# Patient Record
Sex: MALE | Race: WHITE | NOT HISPANIC OR LATINO | Employment: UNEMPLOYED | URBAN - METROPOLITAN AREA
[De-identification: names, ages, dates, MRNs, and addresses within clinical notes are randomized per-mention and may not be internally consistent; named-entity substitution may affect disease eponyms.]

---
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
## 2023-01-04 ENCOUNTER — CLINICAL SUPPORT (OUTPATIENT)
Dept: FAMILY MEDICINE CLINIC | Facility: CLINIC | Age: 18
End: 2023-01-04

## 2023-01-04 DIAGNOSIS — Z23 IMMUNIZATION DUE: Primary | ICD-10-CM

## 2024-12-30 ENCOUNTER — OFFICE VISIT (OUTPATIENT)
Dept: FAMILY MEDICINE CLINIC | Facility: CLINIC | Age: 19
End: 2024-12-30
Payer: COMMERCIAL

## 2024-12-30 VITALS
DIASTOLIC BLOOD PRESSURE: 82 MMHG | TEMPERATURE: 98.7 F | RESPIRATION RATE: 16 BRPM | HEIGHT: 71 IN | BODY MASS INDEX: 21.73 KG/M2 | HEART RATE: 66 BPM | OXYGEN SATURATION: 99 % | SYSTOLIC BLOOD PRESSURE: 122 MMHG | WEIGHT: 155.2 LBS

## 2024-12-30 DIAGNOSIS — K50.90 CROHN'S DISEASE WITHOUT COMPLICATION, UNSPECIFIED GASTROINTESTINAL TRACT LOCATION (HCC): ICD-10-CM

## 2024-12-30 DIAGNOSIS — Z00.01 ENCOUNTER FOR WELL ADULT EXAM WITH ABNORMAL FINDINGS: Primary | ICD-10-CM

## 2024-12-30 PROCEDURE — 99395 PREV VISIT EST AGE 18-39: CPT | Performed by: FAMILY MEDICINE

## 2024-12-30 NOTE — PROGRESS NOTES
"Name: Logan Seaver      : 2005      MRN: [id number]  Encounter Provider: Amanuel Mckeon DO  Encounter Date: 2024   Encounter department: [facility]  :  Assessment & Plan  Encounter for well adult exam with abnormal findings  19-year-old male  History of Crohn's disease Followed by GI and gets infusions  Otherwise he is doing well with no other complaints he can follow-up in 1 year or sooner if needed he is up-to-date and health maintenance  Discussed possibility of an updated pneumonia shot sometime in the future       Crohn's disease without complication, unspecified gastrointestinal tract location (HCC)               Depression Screening and Follow-up Plan: Patient was screened for depression during today's encounter. They screened negative with a PHQ-2 score of 0.      History of Present Illness     Patient presents today for yearly physical  Overall he is doing well  He has a GI appointment tomorrow for follow-up for his Crohn's  His Crohn's been under control with his infusions  No other complaints today      Review of Systems   Constitutional: Negative.    HENT: Negative.     Eyes: Negative.    Respiratory: Negative.     Cardiovascular: Negative.    Gastrointestinal: Negative.    Endocrine: Negative.    Genitourinary: Negative.    Musculoskeletal: Negative.    Skin: Negative.    Allergic/Immunologic: Negative.    Neurological: Negative.    Hematological: Negative.    Psychiatric/Behavioral: Negative.     All other systems reviewed and are negative.      Objective   /82 (BP Location: Left arm, Patient Position: Sitting, Cuff Size: Large)   Pulse 66   Temp 98.7 °F (37.1 °C) (Tympanic)   Resp 16   Ht 5' 11.4\" (1.814 m)   Wt 70.4 kg (155 lb 3.2 oz)   SpO2 99%   BMI 21.40 kg/m²      Physical Exam  Vitals and nursing note reviewed.   Constitutional:       Appearance: Normal appearance. He is well-developed.   HENT:      Head: Normocephalic.      Right Ear: External " ear normal.      Left Ear: External ear normal.      Nose: Nose normal.   Eyes:      Conjunctiva/sclera: Conjunctivae normal.      Pupils: Pupils are equal, round, and reactive to light.   Cardiovascular:      Rate and Rhythm: Normal rate and regular rhythm.      Heart sounds: Normal heart sounds.   Pulmonary:      Effort: Pulmonary effort is normal.      Breath sounds: Normal breath sounds.   Abdominal:      General: Bowel sounds are normal.      Palpations: Abdomen is soft.   Musculoskeletal:         General: Normal range of motion.      Cervical back: Normal range of motion and neck supple.   Skin:     General: Skin is warm and dry.   Neurological:      General: No focal deficit present.      Mental Status: He is alert and oriented to person, place, and time.   Psychiatric:         Behavior: Behavior normal.         Thought Content: Thought content normal.         Judgment: Judgment normal.

## 2024-12-31 ENCOUNTER — OFFICE VISIT (OUTPATIENT)
Age: 19
End: 2024-12-31
Payer: COMMERCIAL

## 2024-12-31 ENCOUNTER — TELEPHONE (OUTPATIENT)
Age: 19
End: 2024-12-31

## 2024-12-31 VITALS
WEIGHT: 153 LBS | SYSTOLIC BLOOD PRESSURE: 118 MMHG | BODY MASS INDEX: 21.42 KG/M2 | HEART RATE: 73 BPM | HEIGHT: 71 IN | OXYGEN SATURATION: 99 % | DIASTOLIC BLOOD PRESSURE: 80 MMHG | TEMPERATURE: 98.4 F

## 2024-12-31 DIAGNOSIS — K50.113 CROHN'S DISEASE OF LARGE INTESTINE WITH FISTULA (HCC): Primary | ICD-10-CM

## 2024-12-31 DIAGNOSIS — D84.9 IMMUNOSUPPRESSION (HCC): ICD-10-CM

## 2024-12-31 PROCEDURE — 99214 OFFICE O/P EST MOD 30 MIN: CPT | Performed by: INTERNAL MEDICINE

## 2024-12-31 RX ORDER — SODIUM CHLORIDE, SODIUM LACTATE, POTASSIUM CHLORIDE, CALCIUM CHLORIDE 600; 310; 30; 20 MG/100ML; MG/100ML; MG/100ML; MG/100ML
125 INJECTION, SOLUTION INTRAVENOUS CONTINUOUS
Status: CANCELLED | OUTPATIENT
Start: 2024-12-31
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
## 2024-12-31 NOTE — H&P (VIEW-ONLY)
Gastroenterology Outpatient Follow-up - Crohn's Disease  Logan Seaver 19 y.o. male MRN: [id number]  Encounter: [id number]    Logan Seaver is a 19 y.o. male with Crohn's disease.    Symptom onset:  2016  Diagnosis:  Crohns disease  Year of diagnosis:  2021    IBD Summary: Carlos has history of fistulizing perianal Crohn's disease with mild involvement of the rectum and TI, diagnosed in 2021.  He has been in clinical remission on infliximab 10 mg/kg every 8 weeks since diagnosis.  Establishing care with me in 12/2024.    CD Summary  Current Crohn's disease phenotype:  penetrating    Involved sites since disease onset   Esophagus: no   Stomach: no     Duodenum: no   Jejunum: no   Ileum: yes   Right colon: no   Transverse colon: no   Left colon: yes   Rectum: no   Anus: no     History of stricture  Esophagus: no   Stomach: no   Duodenum: no   Jejunum: no   Ileum: no   Right colon: no   Transverse colon: no   Left colon: no   Rectum: no   Anus: no     History of fistula other than perianal:  Intra-abdominal   abcess: no      Enteroenteric fistula: no      Enterocutaneous fistula: no      Enterovesical fistula: no      Other fistula: no            Surgical History  Number of IBD surgeries: 0      First IBD surgery:    Most Recent IBD surgery:    Esophageal:  0    Gastroduodenal:  0    Small bowel resection(s):  0    Ileocolonic resection(s): 0      Colonic resection(s):  0    Ileostomy or colostomy:  no previous ostomy    Complete colectomy:  No         Medications     Year last used Reason for discontinuation   Corticosteroids   never         Thiopurines   never         Methotrexate   never         Infliximab prior     CR     Adalimumab   never         Certolizumab   never         Golimumab   never         Natalizumab   never         Mesalamine prior   2021 PNR     Sulfasalzine   never         Vedolizumab   never         Ustekinumab   never         Tofacitinib   never         Other biologic   never              Extraintestinal Manifestations    IBD-associated arthropathy No    Uveitis No    Oral aphthous ulcers No   Erythema nodosum No    Pyoderma gangrenosum No    Primary sclerosing cholangitis No    Thrombotic complications No          Cancer / Dysplasia History  History of IBD-associated dysplasia: none    Date of diagnosis (Year):     History of colorectal cancer: No   History of cervical dysplasia: No   History of skin cancer: none         Laboratory Data   Most recent (date) Result   PPD   unknown   Quanitferon gold 4/11/2023 negative   TPMT   unknown   Hepatitis A   unknown   HBsAb 4/11/2023 negative   HBcAb 4/11/2023 negative   HBsAg 4/11/2023 negative   HCV Ab   unknown       Imaging / Diagnostic Procedures   Most recent (date) Findings   Colonoscopy or sigmoidoscopy #1              Ulcers/Erosions              Strictures              Stricture Severity              Endoscopic Score Stephen Score:    Rutgeert's:               Findings              Pathology      Colonoscopy or sigmoidoscopy #2 6/29/2021            Ulcers/Erosions  small              Strictures  none              Stricture Severity  N/A           Endoscopic Score Stephen Score:  0 Rugeert's:  N/A           Findings  Large perianal skin tags, scattered aphthous ulcers in sigmoid colon and exudate in the TI.   > Path: normal esophagus, antrum, and duodenum. TI with small bowel mucosa; R colon with colonic mucosa; L colon with single focus of minimal acute cryptitis and no architectural distortion seen           Pathology      EGD 6/29/2021     Small bowel follow-through       CT enterography       CT without enterography       MRI enterography       Capsule study       DEXA scan   Lowest Z-score:      CXR (for TB)           HPI:   Interval History:  12/31/2024 Initial visit  Carlos is establishing care with me for history of fistulizing ileal, colonic, and perianal Crohn's disease.    He initially developed symptoms in 2016 consisting of loose stools  and abdominal pain, and was evaluated at [facility].  Of note, his brother has infantile-onset IBD.  Initial EGD and colonoscopy were normal and his symptoms improved.  However, in 2021, he started having rectal bleeding, anal pain with defecation, and sensation of swelling in the rectal region.  Repeat EGD and colonoscopy in 2021 showed large skin tags, scattered aphthous ulcers in the sigmoid colon, and exudate in the TI.  Biopsies were again normal, except for a small focus of mild cryptitis.  He was treated with topical mesalamine, although MRI enterography did suggest a short segment of TI inflammation.  Subsequently, he developed worsening perianal pain, and pelvic MRI in August 2021 showed a rectal abscess with mass effect on the lumen and extensive surrounding inflammation, as well as a transsphincteric fistula.  He underwent EUA, but abscess or fistula could not be found, so he was treated with antibiotics and started on infliximab 10 mg/kg every 8 weeks.  Repeat MRI in March 2022 showed near complete resolution of the abscess and fistulas.    Carlos has remained on infliximab 10 mg/kg every 8 weeks and, overall, feels well.  He is in his last semester of college in [place] and has continued to get his infusions over there.  He did see a gastroenterologist in 2023, but has not followed up regularly.  His last colonoscopy was in 2021.  He recently had mild bleeding in the setting of constipation and an inflamed skin tag, and this is improving.  No recent infliximab level.  Last QuantiFERON and hepatitis labs are from 2023.  He had influenza and strep throat in November.  He has not received the flu vaccine this season but usually gets it.  He has not had Prevnar 20.  He has not recently seen dermatology.  No history of smoking.  As mentioned, he has a younger brother diagnosed with severe IBD at 18 months of age.  He is home for a few more days and would like to get updated labs and schedule  "colonoscopy.    Carlos reports the following symptoms over the last 7 days:    Stool Frequency normal   Average stools per day: 1   Average liquid stools per day: 0   Consistency of bowel: formed   Awakening from sleep to move bowels? No   Urgency no fecal urgency   Visible blood in stool? none   Abdominal pain? none   General Wellbeing? generally well     Carlos also reports the following symptoms in the last month:    Leakage of stool while sleeping? No   Leakage of stool while awake? No       REVIEW OF SYSTEMS:  During the last 7 days, Carlos experienced the following symptoms:  Unintentional Weight Loss (in the last month) No   Fever No   Eye irritation No   Mouth sores No   Sore throat No   Chest pain No   Shortness of breath No   Numbness or tingling in hands or feet No   Skin rash No   Pain or swelling in joints No   Bruising or bleeding No   Felt depressed or blue No   Fatigue No   Dysuria No   Please see HPI for additional pertinent review of systems; otherwise remainder of ROS was unremarkable    MEDICATIONS:    Current Outpatient Medications:     cholecalciferol (VITAMIN D3) 1,000 units tablet    inFLIXimab-dyyb (INFLECTRA IV)    Multiple Vitamins-Minerals (multivitamin with minerals) tablet    ALLERGIES:  Allergies   Allergen Reactions    Gadolinium Rash     Rash on his bottom and body after MRE and MRI pelvis       OBJECTIVE:  /80 (BP Location: Left arm, Patient Position: Sitting, Cuff Size: Adult)   Pulse 73   Temp 98.4 °F (36.9 °C) (Tympanic)   Ht 5' 11.4\" (1.814 m)   Wt 69.4 kg (153 lb)   SpO2 99%   BMI 21.10 kg/m²      PHYSICAL EXAM:    General Appearance:   Alert, cooperative, no distress   HEENT:   Normocephalic, atraumatic, anicteric.     Neck:  Supple, symmetrical, trachea midline   Lungs:   Clear to auscultation bilaterally; no rales, rhonchi or wheezing; respirations unlabored    Heart::   Regular rate and rhythm; no murmur, rub, or gallop.   Abdomen:   Soft, non-distended; normal " "bowel sounds    Abdominal exam was notable for no tenderness with no mass.     Genitalia:   Deferred    Rectal:   Perirectal assessment was not performed.                     Extremities:  No cyanosis, clubbing or edema    Pulses:  2+ and symmetric    Skin:  No jaundice, rashes, or lesions    Lymph nodes:  No palpable cervical lymphadenopathy      No results found for: \"WBC\", \"HGB\", \"HCT\", \"MCV\", \"PLT\"  No results found for: \"NA\", \"SODIUM\", \"K\", \"CL\", \"CO2\", \"ANIONGAP\", \"AGAP\", \"BUN\", \"CREATININE\", \"GLUC\", \"GLUF\", \"CALCIUM\", \"AST\", \"ALT\", \"ALKPHOS\", \"PROT\", \"TP\", \"BILITOT\", \"TBILI\", \"EGFR\"  No results found for: \"CRP\"  No results found for: \"VDFKOVRE75\"  No results found for: \"FERRITIN\"    ASSESSMENT AND PLAN:    Carlos has a history of penetrating Crohn’s disease diagnosed in 2021 with involvement in the following areas:      Ileum,   Left colon,   with perianal fistula . Surgical history includes no small bowel resections, no colon resections, and no prior ostomy. Current medical therapy is with infliximab 10 mg/kg every 8 weeks. My global assessment is that the clinical disease activity is currently quiescent.    The 6-point Stephen score was 0 and the 9-point Stephen score was 0.  The short CDAI was 44.      Carlos has history of Crohn's disease with mild luminal involvement of the TI and sigmoid colon and penetrating perianal complications.  He has a family history of IBD.  He has been on infliximab since 2021 and doing well clinically.  It sounds like he may have an inflamed skin tag currently, but otherwise seems to be in remission.  He is overdue for labs and colonoscopy.  He has a good understanding of his disease.  1.  We will schedule colonoscopy while he is home for break.  2.  Check labs including CBC, CMP, CRP.  3.  Also check B12, iron, vitamin D.  4.  Check infliximab trough level and antibodies.  5.  Referral to dermatology.  Recommendations below.  6.  I recommend the Prevnar 20 vaccine and will discuss " with him.  7.  I recommend establishing regular care with GI in [place].  I am happy to see him whenever he is in the area.     Health Maintenance Recommendations:  Vaccines & Infections  COVID-19 vaccination and boosters are recommended. There is no evidence that the COVID-19 vaccine would cause an IBD flare.  Avoid live vaccines if on immunosuppressive therapy.  Yearly influenza vaccine (flu shot).  Pneumonia vaccines for patients on immunosuppression. These include Prevnar 20, followed by Pneumovax 23 at least 8 weeks later.  Shingrix vaccine (series of 2 injections) for al patients 65 and older. Patients on tofacitinib or upadacitinib should be vaccinated regardless of age.  If not immune to measles mumps or rubella, MMR vaccine is recommended. However, this is a live vaccine and should be given prior to immunosuppressive therapy.  HPV vaccination as per national guidelines.  Hepatitis A and B vaccinations if not previously vaccinated.  Testing for tuberculosis with QuantiFERON Gold blood test and/or chest xray prior to starting immunosuppressive medications, and then annually    Cancer screening  Dysplasia surveillance for colorectal cancer. Colonoscopy in all patients with extensive colitis (more than 1/3 of the colon involved) who had disease for at least 8 or more years.  Repeat colonoscopy approximately every 12-24 months.  In patients with concurrent primary sclerosing cholangitis, history of dysplasia, or family history of colon cancer, repeat colonoscopy annually.    Females: Pap smear annually for woman on immunosuppression.  Annual dermatologic/skin exam in all patients with IBD, especially those on immunosuppression with thiopurines or ANALI inhibitors.    Miscellaneous  DEXA scan, once off steroids for 3 months  Depression screening recommended annually  Routine dental and ophthalmology examinations    Problem List Items Addressed This Visit       Crohn's disease (HCC) - Primary    Relevant Orders     CBC and differential    Comprehensive metabolic panel    C-reactive protein    TIBC Panel (incl. Iron, TIBC, % Iron Saturation)    Vitamin D 25 hydroxy    Vitamin B12    Ferritin    Hepatitis B surface antigen    Hepatitis C antibody    Quantiferon TB Gold Plus Assay    Infliximab Concentration and Anti-Infliximab Antibody    Colonoscopy    Ambulatory Referral to Dermatology     Other Visit Diagnoses         Immunosuppression (HCC)        Relevant Orders    CBC and differential    Comprehensive metabolic panel    C-reactive protein    TIBC Panel (incl. Iron, TIBC, % Iron Saturation)    Vitamin D 25 hydroxy    Vitamin B12    Ferritin    Hepatitis B surface antigen    Hepatitis C antibody    Quantiferon TB Gold Plus Assay    Infliximab Concentration and Anti-Infliximab Antibody    Colonoscopy    Ambulatory Referral to Dermatology            Shameka García MD

## 2024-12-31 NOTE — TELEPHONE ENCOUNTER
Procedure: colon  Date: Jan 3rd  Physician performing: Dr. García   Location of procedure:  [facility]  Instructions given to patient: miralax  Diabetic: n/a  Clearances: n/a

## 2025-01-01 RX ORDER — SODIUM CHLORIDE 9 MG/ML
125 INJECTION, SOLUTION INTRAVENOUS CONTINUOUS
Status: CANCELLED | OUTPATIENT
Start: 2025-01-01

## 2025-01-02 NOTE — TELEPHONE ENCOUNTER
Reviewed lab schedule - okay to add EGD  Ordered EGD and added for tomorrow  Emailed [facility] about adding EGD for tomorrow    Spoke to Yumiko - informed and agreeable

## 2025-01-02 NOTE — TELEPHONE ENCOUNTER
I spoke with Dr. García in the office.  Dr. García agreeable to add on EGD if endoscopy center able to add to schedule.

## 2025-01-02 NOTE — TELEPHONE ENCOUNTER
Colonoscopy tomorrow with Dr. García.    Mom Yumiko (on Communication Consent form) called. Pt is questioning if EGD can be added. States this was discussed at 12/31/24 OV however pt declined at the time. Pt feels it would be easier to complete both since he is already fasting.     She is aware this may not be possible due to insurance and Endoscopy Center schedule. Please advise.    Pt or mom home # [phone]

## 2025-01-02 NOTE — TELEPHONE ENCOUNTER
I spoke with the patient's mother. Mother questioning if patient should complete blood work that was ordered at office visit, including Infliximab level. Mother states patient was due for Infliximab infusion on December 25 (receives infusion every 8 weeks) and currently scheduled 1/6 for infusion due to holiday schedule.  Discussed with Dr. Raquel García requesting patient to complete Infliximab level and blood work today if possible.  Mother agreeable and states patient will go to [facility] for blood work today.

## 2025-01-03 ENCOUNTER — HOSPITAL ENCOUNTER (OUTPATIENT)
Dept: GASTROENTEROLOGY | Facility: HOSPITAL | Age: 20
Setting detail: OUTPATIENT SURGERY
End: 2025-01-03
Attending: INTERNAL MEDICINE
Payer: COMMERCIAL

## 2025-01-03 ENCOUNTER — ANESTHESIA (OUTPATIENT)
Dept: GASTROENTEROLOGY | Facility: HOSPITAL | Age: 20
End: 2025-01-03
Payer: COMMERCIAL

## 2025-01-03 ENCOUNTER — ANESTHESIA EVENT (OUTPATIENT)
Dept: GASTROENTEROLOGY | Facility: HOSPITAL | Age: 20
End: 2025-01-03
Payer: COMMERCIAL

## 2025-01-03 VITALS
HEIGHT: 71 IN | DIASTOLIC BLOOD PRESSURE: 81 MMHG | TEMPERATURE: 97.3 F | OXYGEN SATURATION: 100 % | RESPIRATION RATE: 16 BRPM | HEART RATE: 71 BPM | SYSTOLIC BLOOD PRESSURE: 108 MMHG | BODY MASS INDEX: 21.42 KG/M2 | WEIGHT: 153 LBS

## 2025-01-03 DIAGNOSIS — K50.113 CROHN'S DISEASE OF LARGE INTESTINE WITH FISTULA (HCC): ICD-10-CM

## 2025-01-03 DIAGNOSIS — D84.9 IMMUNOSUPPRESSION (HCC): ICD-10-CM

## 2025-01-03 PROCEDURE — 88305 TISSUE EXAM BY PATHOLOGIST: CPT | Performed by: PATHOLOGY

## 2025-01-03 PROCEDURE — 45380 COLONOSCOPY AND BIOPSY: CPT | Performed by: INTERNAL MEDICINE

## 2025-01-03 PROCEDURE — 43239 EGD BIOPSY SINGLE/MULTIPLE: CPT | Performed by: INTERNAL MEDICINE

## 2025-01-03 PROCEDURE — 45385 COLONOSCOPY W/LESION REMOVAL: CPT | Performed by: INTERNAL MEDICINE

## 2025-01-03 RX ORDER — SODIUM CHLORIDE, SODIUM LACTATE, POTASSIUM CHLORIDE, CALCIUM CHLORIDE 600; 310; 30; 20 MG/100ML; MG/100ML; MG/100ML; MG/100ML
125 INJECTION, SOLUTION INTRAVENOUS CONTINUOUS
Status: DISCONTINUED | OUTPATIENT
Start: 2025-01-03 | End: 2025-01-07 | Stop reason: HOSPADM

## 2025-01-03 RX ORDER — PROPOFOL 10 MG/ML
INJECTION, EMULSION INTRAVENOUS AS NEEDED
Status: DISCONTINUED | OUTPATIENT
Start: 2025-01-03 | End: 2025-01-03

## 2025-01-03 RX ORDER — SODIUM CHLORIDE 9 MG/ML
125 INJECTION, SOLUTION INTRAVENOUS CONTINUOUS
Status: DISCONTINUED | OUTPATIENT
Start: 2025-01-03 | End: 2025-01-07 | Stop reason: HOSPADM

## 2025-01-03 RX ADMIN — PROPOFOL 50 MG: 10 INJECTION, EMULSION INTRAVENOUS at 10:41

## 2025-01-03 RX ADMIN — PROPOFOL 50 MG: 10 INJECTION, EMULSION INTRAVENOUS at 10:27

## 2025-01-03 RX ADMIN — PROPOFOL 100 MG: 10 INJECTION, EMULSION INTRAVENOUS at 10:19

## 2025-01-03 RX ADMIN — PROPOFOL 50 MG: 10 INJECTION, EMULSION INTRAVENOUS at 10:43

## 2025-01-03 RX ADMIN — PROPOFOL 100 MG: 10 INJECTION, EMULSION INTRAVENOUS at 10:24

## 2025-01-03 RX ADMIN — PROPOFOL 50 MG: 10 INJECTION, EMULSION INTRAVENOUS at 10:23

## 2025-01-03 RX ADMIN — PROPOFOL 50 MG: 10 INJECTION, EMULSION INTRAVENOUS at 10:36

## 2025-01-03 RX ADMIN — PROPOFOL 50 MG: 10 INJECTION, EMULSION INTRAVENOUS at 10:20

## 2025-01-03 RX ADMIN — PROPOFOL 150 MG: 10 INJECTION, EMULSION INTRAVENOUS at 10:18

## 2025-01-03 RX ADMIN — PROPOFOL 50 MG: 10 INJECTION, EMULSION INTRAVENOUS at 10:33

## 2025-01-03 RX ADMIN — PROPOFOL 50 MG: 10 INJECTION, EMULSION INTRAVENOUS at 10:39

## 2025-01-03 RX ADMIN — PROPOFOL 50 MG: 10 INJECTION, EMULSION INTRAVENOUS at 10:30

## 2025-01-03 RX ADMIN — SODIUM CHLORIDE 125 ML/HR: 0.9 INJECTION, SOLUTION INTRAVENOUS at 09:45

## 2025-01-03 RX ADMIN — PROPOFOL 50 MG: 10 INJECTION, EMULSION INTRAVENOUS at 10:46

## 2025-01-03 NOTE — ANESTHESIA POSTPROCEDURE EVALUATION
Post-Op Assessment Note    CV Status:  Stable  Pain Score: 0    Pain management: adequate       Mental Status:  Sleepy   Hydration Status:  Stable   PONV Controlled:  None   Airway Patency:  Patent     Post Op Vitals Reviewed: Yes    No anethesia notable event occurred.    Staff: CRNA       Last Filed PACU Vitals:  Vitals Value Taken Time   Temp     Pulse     BP     Resp     SpO2

## 2025-01-03 NOTE — INTERVAL H&P NOTE
H&P reviewed. After examining the patient I find no changes in the patients condition since the H&P had been written.    Vitals:    01/03/25 0937   BP: 138/90   Pulse: 73   Resp: 16   Temp: 98.5 °F (36.9 °C)   SpO2: 99%

## 2025-01-03 NOTE — ANESTHESIA PREPROCEDURE EVALUATION
Procedure:  COLONOSCOPY  EGD    Relevant Problems   CARDIO   (+) Migraine headache      NEURO/PSYCH   (+) Anxiety   (+) Migraine headache      Other   (+) Crohn's disease (HCC)        Physical Exam    Airway    Mallampati score: II  TM Distance: >3 FB  Neck ROM: full     Dental   No notable dental hx     Cardiovascular  Rhythm: regular, Rate: normal, Cardiovascular exam normal    Pulmonary  Pulmonary exam normal Breath sounds clear to auscultation    Other Findings        Anesthesia Plan  ASA Score- 2     Anesthesia Type- general with ASA Monitors.         Additional Monitors:     Airway Plan:            Plan Factors-    Chart reviewed.    Patient summary reviewed.                  Induction-     Postoperative Plan-         Informed Consent- Anesthetic plan and risks discussed with patient.  I personally reviewed this patient with the CRNA. Discussed and agreed on the Anesthesia Plan with the CRNA..

## 2025-01-05 ENCOUNTER — RESULTS FOLLOW-UP (OUTPATIENT)
Age: 20
End: 2025-01-05

## 2025-01-05 DIAGNOSIS — K50.113 CROHN'S DISEASE OF LARGE INTESTINE WITH FISTULA (HCC): Primary | ICD-10-CM

## 2025-01-05 LAB
25(OH)D3 SERPL-MCNC: 33 NG/ML (ref 30–100)
ALBUMIN SERPL-MCNC: 4.7 G/DL (ref 3.6–5.1)
ALBUMIN/GLOB SERPL: 1.7 (CALC) (ref 1–2.5)
ALP SERPL-CCNC: 80 U/L (ref 36–130)
ALT SERPL-CCNC: 16 U/L (ref 9–46)
AST SERPL-CCNC: 16 U/L (ref 10–40)
BASOPHILS # BLD AUTO: 20 CELLS/UL (ref 0–200)
BASOPHILS NFR BLD AUTO: 0.2 %
BILIRUB SERPL-MCNC: 0.6 MG/DL (ref 0.2–1.2)
BUN SERPL-MCNC: 11 MG/DL (ref 7–25)
BUN/CREAT SERPL: NORMAL (CALC) (ref 6–22)
CALCIUM SERPL-MCNC: 9.5 MG/DL (ref 8.6–10.3)
CHLORIDE SERPL-SCNC: 101 MMOL/L (ref 98–110)
CO2 SERPL-SCNC: 28 MMOL/L (ref 20–32)
COMMENT: ABNORMAL
CREAT SERPL-MCNC: 0.7 MG/DL (ref 0.6–1.24)
CRP SERPL-MCNC: 3.3 MG/L
EOSINOPHIL # BLD AUTO: 29 CELLS/UL (ref 15–500)
EOSINOPHIL NFR BLD AUTO: 0.3 %
ERYTHROCYTE [DISTWIDTH] IN BLOOD BY AUTOMATED COUNT: 12.6 % (ref 11–15)
FERRITIN SERPL-MCNC: 72 NG/ML (ref 38–380)
GFR/BSA.PRED SERPLBLD CYS-BASED-ARV: 134 ML/MIN/1.73M2
GLOBULIN SER CALC-MCNC: 2.8 G/DL (CALC) (ref 1.9–3.7)
GLUCOSE SERPL-MCNC: 81 MG/DL (ref 65–99)
HBV SURFACE AG SERPL QL IA: NORMAL
HCT VFR BLD AUTO: 47.4 % (ref 38.5–50)
HCV AB SERPL QL IA: NORMAL
HGB BLD-MCNC: 15.8 G/DL (ref 13.2–17.1)
INFLIXIMAB SERPL-MCNC: 40 AU
INTERPRETATION: ABNORMAL
IRON SATN MFR SERPL: 24 % (CALC) (ref 20–48)
IRON SERPL-MCNC: 90 MCG/DL (ref 50–195)
LYMPHOCYTES # BLD AUTO: 2352 CELLS/UL (ref 850–3900)
LYMPHOCYTES NFR BLD AUTO: 24 %
M TB IFN-G CD4+ BCKGRND COR BLD-ACNC: 0.01 IU/ML
M TB IFN-G CD4+ BCKGRND COR BLD-ACNC: 0.02 IU/ML
M TB IFN-G CD4+ T-CELLS BLD-ACNC: 0.03 IU/ML
M TB TUBERC IFN-G BLD QL: NEGATIVE
M TB TUBERC IGNF/MITOGEN IGNF CONTROL: 7.87 IU/ML
MCH RBC QN AUTO: 28.5 PG (ref 27–33)
MCHC RBC AUTO-ENTMCNC: 33.3 G/DL (ref 32–36)
MCV RBC AUTO: 85.4 FL (ref 80–100)
MONOCYTES # BLD AUTO: 637 CELLS/UL (ref 200–950)
MONOCYTES NFR BLD AUTO: 6.5 %
NEUTROPHILS # BLD AUTO: 6762 CELLS/UL (ref 1500–7800)
NEUTROPHILS NFR BLD AUTO: 69 %
PLATELET # BLD AUTO: 250 THOUSAND/UL (ref 140–400)
PMV BLD REES-ECKER: 11.1 FL (ref 7.5–12.5)
POTASSIUM SERPL-SCNC: 4 MMOL/L (ref 3.5–5.3)
PROT SERPL-MCNC: 7.5 G/DL (ref 6.1–8.1)
RBC # BLD AUTO: 5.55 MILLION/UL (ref 4.2–5.8)
SODIUM SERPL-SCNC: 137 MMOL/L (ref 135–146)
TIBC SERPL-MCNC: 376 MCG/DL (CALC) (ref 250–425)
VIT B12 SERPL-MCNC: 557 PG/ML (ref 200–1100)
WBC # BLD AUTO: 9.8 THOUSAND/UL (ref 3.8–10.8)

## 2025-01-07 PROCEDURE — 88305 TISSUE EXAM BY PATHOLOGIST: CPT | Performed by: PATHOLOGY

## 2025-01-12 ENCOUNTER — RESULTS FOLLOW-UP (OUTPATIENT)
Dept: GASTROENTEROLOGY | Facility: CLINIC | Age: 20
End: 2025-01-12

## 2025-03-04 ENCOUNTER — TELEPHONE (OUTPATIENT)
Dept: DERMATOLOGY | Facility: CLINIC | Age: 20
End: 2025-03-04

## 2025-03-04 NOTE — TELEPHONE ENCOUNTER
Spoke to patients mother to RS 12/23/25 appt with Dr Dozier. Patient was RS to 11/19/25 with Dr Bellamy at 1140 at the [facility] location.

## 2025-04-17 ENCOUNTER — TELEPHONE (OUTPATIENT)
Dept: DERMATOLOGY | Facility: CLINIC | Age: 20
End: 2025-04-17

## 2025-04-17 NOTE — TELEPHONE ENCOUNTER
LMOM  with location change (Dr Bellamy 11/19/25 [facility] Office to Dr Bellamy at the [facility]) due to her no longer going to the [facility] office, to call the office to confirm or r/s. When pt calls back schedule  appropriately, I do not have anything held.

## 2025-04-22 ENCOUNTER — TELEPHONE (OUTPATIENT)
Dept: GASTROENTEROLOGY | Facility: CLINIC | Age: 20
End: 2025-04-22

## 2025-04-22 NOTE — TELEPHONE ENCOUNTER
Hi Dr. García,      I am graduating from [employer] and will return home on May 5th. My last infusion at [facility] was on 4/21/2025. I wanted to get my infusions set up at [facility] and was hoping you could help me with that.      Thanks so much,   Carlos

## 2025-04-28 NOTE — TELEPHONE ENCOUNTER
4/28 Can you please review pts Insurances. Can you tell what the primary ins is and verify if it is still active?

## 2025-04-29 NOTE — TELEPHONE ENCOUNTER
Spoke with mom on medication communication consent, [employer] is primary. Commercial insurance secondary and it just for helping to pay outpatient procedure

## 2025-05-22 ENCOUNTER — PATIENT MESSAGE (OUTPATIENT)
Age: 20
End: 2025-05-22

## 2025-05-22 DIAGNOSIS — K50.919 CROHN'S DISEASE WITH COMPLICATION, UNSPECIFIED GASTROINTESTINAL TRACT LOCATION (HCC): Primary | ICD-10-CM

## 2025-05-23 ENCOUNTER — TELEPHONE (OUTPATIENT)
Age: 20
End: 2025-05-23

## 2025-05-23 RX ORDER — SODIUM CHLORIDE 9 MG/ML
20 INJECTION, SOLUTION INTRAVENOUS ONCE
OUTPATIENT
Start: 2025-06-16

## 2025-05-23 NOTE — TELEPHONE ENCOUNTER
I called the women's health of [facility] and spoke with Lucrecia.  Lucrecia aware our office will contact the patient if he will be returning to their practice or if patient is transferring care to [facility].  Lucrecia requesting patient to contact their office if pt will be transferring care to [facility].

## 2025-05-23 NOTE — TELEPHONE ENCOUNTER
Lizbeth calling from [facility] requesting to speak to Dr. García. Attempted to connect with IBD RN. Will task for return call.  Ph: [phone]  ext [phone]

## 2025-05-23 NOTE — TELEPHONE ENCOUNTER
5/23 received a call from [employer] [facility] [employer] that a different providers office was calling them complaining that their auth was shortened so that our auth could be completed. They are upset that they were not told the pt was no longer going to be treated with them. I advised that the pt told us he was graduating from college and moving home and wanted his infusions to continue here. If they would like to call office and discuss with management they are welcome too. And gave office phone.

## 2025-06-16 ENCOUNTER — HOSPITAL ENCOUNTER (OUTPATIENT)
Dept: INFUSION CENTER | Facility: HOSPITAL | Age: 20
End: 2025-06-16
Attending: INTERNAL MEDICINE

## 2025-06-17 ENCOUNTER — HOSPITAL ENCOUNTER (OUTPATIENT)
Dept: INFUSION CENTER | Facility: HOSPITAL | Age: 20
Discharge: HOME | End: 2025-06-17
Attending: INTERNAL MEDICINE

## 2025-06-17 ENCOUNTER — TELEPHONE (OUTPATIENT)
Age: 20
End: 2025-06-17

## 2025-06-17 NOTE — TELEPHONE ENCOUNTER
Patients GI provider:  Dr. García    Number to return call: [phone]    Reason for call: Pt's mother Yumiko (on consent) called because his infusion was cancelled yesterday because the infusion center said it was not authorized. Yumiko states she has letter from both primary and secondary insurances stating it was approved. They scheduled the pt for this Thursday at 8:30 in [facility], but the infusion center told her it was conditionally scheduled, and will probably be cancelled again because it is not enough time to get the insurance stuff figured out.    Scheduled procedure/appointment date if applicable: Appt 8/21/25

## 2025-06-19 ENCOUNTER — HOSPITAL ENCOUNTER (OUTPATIENT)
Dept: INFUSION CENTER | Facility: HOSPITAL | Age: 20
Discharge: HOME | End: 2025-06-19
Attending: INTERNAL MEDICINE
Payer: COMMERCIAL

## 2025-06-19 VITALS
WEIGHT: 149.91 LBS | DIASTOLIC BLOOD PRESSURE: 64 MMHG | OXYGEN SATURATION: 97 % | HEART RATE: 66 BPM | SYSTOLIC BLOOD PRESSURE: 123 MMHG | TEMPERATURE: 98.1 F | BODY MASS INDEX: 20.91 KG/M2 | RESPIRATION RATE: 16 BRPM

## 2025-06-19 DIAGNOSIS — K50.919 CROHN'S DISEASE WITH COMPLICATION, UNSPECIFIED GASTROINTESTINAL TRACT LOCATION (HCC): Primary | ICD-10-CM

## 2025-06-19 PROCEDURE — 96413 CHEMO IV INFUSION 1 HR: CPT

## 2025-06-19 PROCEDURE — 96415 CHEMO IV INFUSION ADDL HR: CPT

## 2025-06-19 RX ORDER — SODIUM CHLORIDE 9 MG/ML
20 INJECTION, SOLUTION INTRAVENOUS ONCE
Status: COMPLETED | OUTPATIENT
Start: 2025-06-19 | End: 2025-06-19

## 2025-06-19 RX ORDER — SODIUM CHLORIDE 9 MG/ML
20 INJECTION, SOLUTION INTRAVENOUS ONCE
OUTPATIENT
Start: 2025-06-20

## 2025-06-19 RX ADMIN — INFLIXIMAB-AXXQ 700 MG: 100 INJECTION, POWDER, LYOPHILIZED, FOR SOLUTION INTRAVENOUS at 09:29

## 2025-06-19 RX ADMIN — SODIUM CHLORIDE 20 ML/HR: 0.9 INJECTION, SOLUTION INTRAVENOUS at 08:41

## 2025-06-19 NOTE — PROGRESS NOTES
Logan Seaver  tolerated treatment well with no complications.      Logan Seaver stated he will be moving to home infusion due to insurance.     AVS printed and given to Logan Seaver:  No (Declined by Logan Seaver)

## 2025-07-14 ENCOUNTER — TELEPHONE (OUTPATIENT)
Age: 20
End: 2025-07-14

## 2025-07-14 NOTE — TELEPHONE ENCOUNTER
Patients GI provider:  Dr. García    Number to return call: [phone] (Carlos's #)    Reason for call: Patient's mom was trying to reach Haley Hoffman to further discuss her son transitioning to home infusions.     Scheduled procedure/appointment date if applicable: Appointment 8/21/25

## 2025-07-17 NOTE — TELEPHONE ENCOUNTER
Pt had called back and Call center had let pt know that I am working on order to [facility] and they will get auth and contact to schedule

## 2025-07-31 NOTE — TELEPHONE ENCOUNTER
RECEIVED APPROVAL FOR PTS SECONDARY INS.     Non hospital based infusion company: [facility]  When referral was made:  How was the referral sent:  Medication: AVSOLA  10MG/KG  Directions: Q 56 DAYS  Pt weight:  Insurance: [place] (SECONDARY)  Authorization Date range: 7/30/25-5/31/26  Authorization Number: [id number]  Patient made aware:Y

## 2025-08-21 ENCOUNTER — OFFICE VISIT (OUTPATIENT)
Dept: GASTROENTEROLOGY | Facility: CLINIC | Age: 20
End: 2025-08-21
Payer: COMMERCIAL

## 2025-08-21 VITALS
WEIGHT: 150.8 LBS | BODY MASS INDEX: 21.11 KG/M2 | DIASTOLIC BLOOD PRESSURE: 81 MMHG | HEIGHT: 71 IN | SYSTOLIC BLOOD PRESSURE: 125 MMHG

## 2025-08-21 DIAGNOSIS — K50.919 CROHN'S DISEASE WITH COMPLICATION, UNSPECIFIED GASTROINTESTINAL TRACT LOCATION (HCC): Primary | ICD-10-CM

## 2025-08-21 DIAGNOSIS — D84.9 IMMUNOSUPPRESSION (HCC): ICD-10-CM

## 2025-08-21 PROCEDURE — 99214 OFFICE O/P EST MOD 30 MIN: CPT | Performed by: INTERNAL MEDICINE

## 2025-08-21 RX ORDER — LANOLIN ALCOHOL/MO/W.PET/CERES
CREAM (GRAM) TOPICAL DAILY
COMMUNITY

## 2025-08-21 RX ORDER — PNEUMOCOCCAL 20-VALENT CONJUGATE VACCINE 2.2; 2.2; 2.2; 2.2; 2.2; 2.2; 2.2; 2.2; 2.2; 2.2; 2.2; 2.2; 2.2; 2.2; 2.2; 2.2; 4.4; 2.2; 2.2; 2.2 UG/.5ML; UG/.5ML; UG/.5ML; UG/.5ML; UG/.5ML; UG/.5ML; UG/.5ML; UG/.5ML; UG/.5ML; UG/.5ML; UG/.5ML; UG/.5ML; UG/.5ML; UG/.5ML; UG/.5ML; UG/.5ML; UG/.5ML; UG/.5ML; UG/.5ML; UG/.5ML
0.5 INJECTION, SUSPENSION INTRAMUSCULAR ONCE
Qty: 0.5 ML | Refills: 0 | Status: SHIPPED | OUTPATIENT
Start: 2025-08-21 | End: 2025-08-21